# Patient Record
Sex: MALE | Race: WHITE | NOT HISPANIC OR LATINO | URBAN - METROPOLITAN AREA
[De-identification: names, ages, dates, MRNs, and addresses within clinical notes are randomized per-mention and may not be internally consistent; named-entity substitution may affect disease eponyms.]

---

## 2020-02-28 ENCOUNTER — IMPORTED ENCOUNTER (OUTPATIENT)
Dept: URBAN - METROPOLITAN AREA CLINIC 50 | Facility: CLINIC | Age: 55
End: 2020-02-28

## 2020-02-28 NOTE — PATIENT DISCUSSION
"""Pappillae reaction +1 seen today.  Pataday Systane sample given in office"" ""Start Cool compresses both eyes twice a day ."" ""Start Pataday both eyes twice a day

## 2020-06-19 ENCOUNTER — IMPORTED ENCOUNTER (OUTPATIENT)
Dept: URBAN - METROPOLITAN AREA CLINIC 50 | Facility: CLINIC | Age: 55
End: 2020-06-19

## 2020-06-22 ENCOUNTER — IMPORTED ENCOUNTER (OUTPATIENT)
Dept: URBAN - METROPOLITAN AREA CLINIC 50 | Facility: CLINIC | Age: 55
End: 2020-06-22

## 2021-03-16 ENCOUNTER — IMPORTED ENCOUNTER (OUTPATIENT)
Dept: URBAN - METROPOLITAN AREA CLINIC 50 | Facility: CLINIC | Age: 56
End: 2021-03-16

## 2021-03-16 NOTE — PATIENT DISCUSSION
"""No evidence of retinal tear/detachment. RD precautions discussed. Patient instructed to call if vision decreases or RD warning signs increase/worsen.  """

## 2021-04-17 ASSESSMENT — VISUAL ACUITY
OD_OTHER: 20/20-. 20/20-.
OD_SC: 20/20-1
OD_OTHER: 20/20. 20/50.
OD_CC: J1@ 16 IN
OD_BAT: 20/20-
OS_CC: J1@ 16 IN
OS_SC: 20/20
OD_PH: 20/20-1
OD_CC: J1+
OS_OTHER: 20/20. 20/50.
OS_BAT: 20/20
OD_BAT: 20/20
OD_SC: 20/30-1
OS_CC: J1+
OD_CC: 20/20
OS_CC: 20/20
OS_OTHER: 20/20-. 20/25-.
OS_SC: 20/15-1
OS_BAT: 20/20-

## 2021-04-17 ASSESSMENT — TONOMETRY
OS_IOP_MMHG: 17
OS_IOP_MMHG: 16
OD_IOP_MMHG: 16
OD_IOP_MMHG: 16